# Patient Record
Sex: FEMALE | Race: WHITE | Employment: UNEMPLOYED | URBAN - NONMETROPOLITAN AREA
[De-identification: names, ages, dates, MRNs, and addresses within clinical notes are randomized per-mention and may not be internally consistent; named-entity substitution may affect disease eponyms.]

---
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
## 2023-01-05 ENCOUNTER — OFFICE VISIT (OUTPATIENT)
Dept: FAMILY MEDICINE CLINIC | Age: 18
End: 2023-01-05

## 2023-01-05 VITALS
HEIGHT: 68 IN | TEMPERATURE: 97.9 F | BODY MASS INDEX: 18.52 KG/M2 | WEIGHT: 122.2 LBS | HEART RATE: 81 BPM | OXYGEN SATURATION: 99 % | DIASTOLIC BLOOD PRESSURE: 72 MMHG | RESPIRATION RATE: 16 BRPM | SYSTOLIC BLOOD PRESSURE: 118 MMHG

## 2023-01-05 DIAGNOSIS — F32.5 MAJOR DEPRESSIVE DISORDER WITH SINGLE EPISODE, IN FULL REMISSION (HCC): Primary | ICD-10-CM

## 2023-01-05 DIAGNOSIS — F41.1 GAD (GENERALIZED ANXIETY DISORDER): ICD-10-CM

## 2023-01-05 PROCEDURE — 99213 OFFICE O/P EST LOW 20 MIN: CPT | Performed by: NURSE PRACTITIONER

## 2023-01-05 ASSESSMENT — PATIENT HEALTH QUESTIONNAIRE - PHQ9
SUM OF ALL RESPONSES TO PHQ QUESTIONS 1-9: 0
SUM OF ALL RESPONSES TO PHQ QUESTIONS 1-9: 0
DEPRESSION UNABLE TO ASSESS: FUNCTIONAL CAPACITY MOTIVATION LIMITS ACCURACY
SUM OF ALL RESPONSES TO PHQ QUESTIONS 1-9: 0
2. FEELING DOWN, DEPRESSED OR HOPELESS: 0
SUM OF ALL RESPONSES TO PHQ QUESTIONS 1-9: 0
SUM OF ALL RESPONSES TO PHQ9 QUESTIONS 1 & 2: 0
1. LITTLE INTEREST OR PLEASURE IN DOING THINGS: 0
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
## 2023-03-01 ENCOUNTER — HOSPITAL ENCOUNTER (EMERGENCY)
Age: 18
Discharge: LEFT BEFORE DISCHARGE | End: 2023-03-01
Attending: EMERGENCY MEDICINE
Payer: COMMERCIAL

## 2023-03-01 VITALS
OXYGEN SATURATION: 100 % | SYSTOLIC BLOOD PRESSURE: 130 MMHG | HEIGHT: 68 IN | RESPIRATION RATE: 17 BRPM | TEMPERATURE: 98.6 F | WEIGHT: 120 LBS | BODY MASS INDEX: 18.19 KG/M2 | DIASTOLIC BLOOD PRESSURE: 69 MMHG | HEART RATE: 84 BPM

## 2023-03-01 DIAGNOSIS — Z53.21 ELOPED FROM EMERGENCY DEPARTMENT: Primary | ICD-10-CM

## 2023-03-01 DIAGNOSIS — R51.9 NONINTRACTABLE HEADACHE, UNSPECIFIED CHRONICITY PATTERN, UNSPECIFIED HEADACHE TYPE: ICD-10-CM

## 2023-03-01 PROCEDURE — 6370000000 HC RX 637 (ALT 250 FOR IP): Performed by: EMERGENCY MEDICINE

## 2023-03-01 PROCEDURE — 99283 EMERGENCY DEPT VISIT LOW MDM: CPT

## 2023-03-01 RX ORDER — DIPHENHYDRAMINE HCL 25 MG
25 TABLET ORAL ONCE
Status: COMPLETED | OUTPATIENT
Start: 2023-03-01 | End: 2023-03-01

## 2023-03-01 RX ORDER — METOCLOPRAMIDE 10 MG/1
10 TABLET ORAL ONCE
Status: COMPLETED | OUTPATIENT
Start: 2023-03-01 | End: 2023-03-01

## 2023-03-01 RX ORDER — ACETAMINOPHEN 325 MG/1
650 TABLET ORAL ONCE
Status: COMPLETED | OUTPATIENT
Start: 2023-03-01 | End: 2023-03-01

## 2023-03-01 RX ADMIN — ACETAMINOPHEN 650 MG: 325 TABLET ORAL at 21:39

## 2023-03-01 RX ADMIN — METOCLOPRAMIDE 10 MG: 10 TABLET ORAL at 21:39

## 2023-03-01 RX ADMIN — DIPHENHYDRAMINE HYDROCHLORIDE 25 MG: 25 TABLET ORAL at 21:39

## 2023-03-01 ASSESSMENT — ENCOUNTER SYMPTOMS
BACK PAIN: 0
VOMITING: 0
SINUS PRESSURE: 0
EYE ITCHING: 0
EYE REDNESS: 0
SORE THROAT: 0
SHORTNESS OF BREATH: 0
RHINORRHEA: 0
EYE DISCHARGE: 0
NAUSEA: 0
CONSTIPATION: 0
BLOOD IN STOOL: 0
ABDOMINAL DISTENTION: 0
DIARRHEA: 0
VOICE CHANGE: 0
WHEEZING: 0
CHOKING: 0
COUGH: 0
EYE PAIN: 0
TROUBLE SWALLOWING: 0
ABDOMINAL PAIN: 0
CHEST TIGHTNESS: 0
PHOTOPHOBIA: 0

## 2023-03-01 ASSESSMENT — PAIN SCALES - GENERAL
PAINLEVEL_OUTOF10: 7
PAINLEVEL_OUTOF10: 7

## 2023-03-01 ASSESSMENT — PAIN DESCRIPTION - PAIN TYPE: TYPE: ACUTE PAIN

## 2023-03-01 ASSESSMENT — PAIN DESCRIPTION - DESCRIPTORS: DESCRIPTORS: ACHING

## 2023-03-01 ASSESSMENT — PAIN - FUNCTIONAL ASSESSMENT: PAIN_FUNCTIONAL_ASSESSMENT: 0-10

## 2023-03-01 ASSESSMENT — PAIN DESCRIPTION - LOCATION: LOCATION: HEAD

## 2023-03-01 ASSESSMENT — PAIN DESCRIPTION - FREQUENCY: FREQUENCY: CONTINUOUS

## 2023-03-01 ASSESSMENT — PAIN DESCRIPTION - ONSET: ONSET: ON-GOING

## 2023-03-02 NOTE — ED TRIAGE NOTES
Pt presents to the ED with c/o a headache that has been going on everyday for the past moth. Pt states there is nothing that triggers her headaches and that they just happen irregularly throughout the day. VSS.

## 2023-03-02 NOTE — ED PROVIDER NOTES
[facility]  eMERGENCY dEPARTMENT eNCOUnter          CHIEF COMPLAINT       Chief Complaint   Patient presents with    Headache       Nurses Notes reviewed and I agree except as noted in the HPI. HISTORY OF PRESENT ILLNESS    Yfn Raymundo is a 21 y.o. female who presents presents today for headache. Patient states she has had a headache for approximately 2 to 3 months. Patient states she may have some mild sinus problems. Patient states she has not had any change in taste or smell. No change in her hearing. She has had no syncopal or presyncopal episodes. She has had no loss of control of either side of her body. She has no speech impediment. No difficulty moving. She states this is not the worst headache of her life. It was not thunderclap and was not sudden in onset. Patient states she does have a history of headaches but they are infrequent in nature. Patient states that over the last month she has been using Motrin for this but it does not seem to help. Patient is otherwise resting comfortably on cot no apparent distress or other physical complaints at this time. REVIEW OF SYSTEMS     Review of Systems   Constitutional:  Negative for activity change, appetite change, diaphoresis, fatigue and unexpected weight change. HENT:  Negative for congestion, ear discharge, ear pain, hearing loss, rhinorrhea, sinus pressure, sore throat, trouble swallowing and voice change. Eyes:  Negative for photophobia, pain, discharge, redness and itching. Respiratory:  Negative for cough, choking, chest tightness, shortness of breath and wheezing. Cardiovascular:  Negative for chest pain, palpitations and leg swelling. Gastrointestinal:  Negative for abdominal distention, abdominal pain, blood in stool, constipation, diarrhea, nausea and vomiting. Endocrine: Negative for polydipsia, polyphagia and polyuria.    Genitourinary:  Negative for decreased urine volume, difficulty urinating, dysuria, enuresis, frequency, hematuria and urgency.   Musculoskeletal:  Negative for arthralgias, back pain, gait problem, myalgias, neck pain and neck stiffness.   Skin:  Negative for pallor and rash.   Allergic/Immunologic: Negative for immunocompromised state.   Neurological:  Positive for headaches. Negative for dizziness, tremors, seizures, syncope, facial asymmetry, weakness, light-headedness and numbness.   Hematological:  Negative for adenopathy. Does not bruise/bleed easily.   Psychiatric/Behavioral:  Negative for agitation, hallucinations and suicidal ideas. The patient is not nervous/anxious.         PAST MEDICAL HISTORY    has a past medical history of Patient denies medical problems.    SURGICAL HISTORY      has a past surgical history that includes Tympanostomy tube placement (Bilateral).    CURRENT MEDICATIONS       Previous Medications    ACETAMINOPHEN (TYLENOL) 325 MG TABLET    Take 650 mg by mouth every 6 hours as needed for Pain    BUPROPION (WELLBUTRIN XL) 150 MG EXTENDED RELEASE TABLET    Take 1 tablet by mouth every morning    IBUPROFEN (ADVIL;MOTRIN) 600 MG TABLET    Take 1 tablet by mouth 3 times daily as needed for Pain    LIDOCAINE VISCOUS HCL (XYLOCAINE) 2 % SOLN SOLUTION    Place 15 mLs vaginally every 3 hours as needed for Irritation       ALLERGIES     has No Known Allergies.    FAMILY HISTORY     She indicated that her mother is alive. She indicated that her father is alive.   family history includes Anxiety Disorder in her father; High Blood Pressure in her father; No Known Problems in her mother.    SOCIAL HISTORY      reports that she has quit smoking. Her smoking use included e-cigarettes. She has never used smokeless tobacco. She reports current drug use. Drug: Marijuana (Weed). She reports that she does not drink alcohol.    PHYSICAL EXAM     INITIAL VITALS:  height is 5' 8\" (1.727 m) and weight is 120 lb (54.4 kg). Her oral temperature is 98.6 °F (37 °C). Her blood pressure is  130/69 and her pulse is 84. Her respiration is 17 and oxygen saturation is 100%. Physical Exam  Vitals and nursing note reviewed. Constitutional:       General: She is not in acute distress. Appearance: She is well-developed. She is not diaphoretic. HENT:      Head: Normocephalic and atraumatic. Right Ear: Hearing, tympanic membrane, ear canal and external ear normal.      Left Ear: Hearing, tympanic membrane, ear canal and external ear normal.      Nose: Congestion present. No rhinorrhea. Right Sinus: No maxillary sinus tenderness or frontal sinus tenderness. Left Sinus: No maxillary sinus tenderness or frontal sinus tenderness. Mouth/Throat:      Pharynx: No oropharyngeal exudate. Eyes:      General: No scleral icterus. Right eye: No discharge. Left eye: No discharge. Conjunctiva/sclera: Conjunctivae normal.      Pupils: Pupils are equal, round, and reactive to light. Neck:      Thyroid: No thyromegaly. Vascular: No JVD. Trachea: No tracheal deviation. Cardiovascular:      Rate and Rhythm: Normal rate and regular rhythm. Pulses:           Carotid pulses are 2+ on the right side and 2+ on the left side. Radial pulses are 2+ on the right side and 2+ on the left side. Femoral pulses are 2+ on the right side and 2+ on the left side. Popliteal pulses are 2+ on the right side and 2+ on the left side. Dorsalis pedis pulses are 2+ on the right side and 2+ on the left side. Posterior tibial pulses are 2+ on the right side and 2+ on the left side. Heart sounds: Normal heart sounds, S1 normal and S2 normal. No murmur heard. No friction rub. No gallop. Pulmonary:      Effort: Pulmonary effort is normal.      Breath sounds: Normal breath sounds. No stridor. No decreased breath sounds, wheezing, rhonchi or rales. Chest:      Chest wall: No mass, lacerations, deformity, swelling, tenderness, crepitus or edema. There is no dullness to percussion. Abdominal:      General: Bowel sounds are normal. There is no distension. Palpations: Abdomen is soft. There is no mass. Tenderness: There is no abdominal tenderness. There is no guarding or rebound. Hernia: No hernia is present. Musculoskeletal:         General: No tenderness. Normal range of motion. Cervical back: Normal range of motion and neck supple. Right lower leg: No edema. Left lower leg: No edema. Lymphadenopathy:      Cervical: No cervical adenopathy. Skin:     General: Skin is warm and dry. Findings: No bruising, ecchymosis, lesion or rash. Neurological:      Mental Status: She is alert and oriented to person, place, and time. GCS: GCS eye subscore is 4. GCS verbal subscore is 5. GCS motor subscore is 6. Cranial Nerves: Cranial nerves 2-12 are intact. No cranial nerve deficit. Sensory: Sensation is intact. Motor: Motor function is intact. Coordination: Coordination is intact. Coordination normal.      Gait: Gait is intact. Deep Tendon Reflexes: Reflexes are normal and symmetric. Reflex Scores:       Tricep reflexes are 2+ on the right side and 2+ on the left side. Bicep reflexes are 2+ on the right side and 2+ on the left side. Brachioradialis reflexes are 2+ on the right side and 2+ on the left side. Patellar reflexes are 2+ on the right side and 2+ on the left side. Achilles reflexes are 2+ on the right side and 2+ on the left side. Comments: Gait and station are normal.   Psychiatric:         Speech: Speech normal.         Behavior: Behavior normal.         Thought Content:  Thought content normal.         Judgment: Judgment normal.         DIFFERENTIAL DIAGNOSIS:   Tension headache migraine headache cluster headache sinus headache    DIAGNOSTIC RESULTS     EKG: All EKG's are interpreted by the Emergency Department Physician who either signs or Co-signs this chart in the absence of a cardiologist.  None    RADIOLOGY: non-plain film images(s) such as CT, Ultrasound and MRI are read by the radiologist.  No orders to display         LABS:   Labs Reviewed - No data to display    EMERGENCY DEPARTMENT COURSE:   Vitals:    Vitals:    03/01/23 2039   BP: 130/69   Pulse: 84   Resp: 17   Temp: 98.6 °F (37 °C)   TempSrc: Oral   SpO2: 100%   Weight: 120 lb (54.4 kg)   Height: 5' 8\" (1.727 m)     Patient was assessed at bedside labs and imaging were ordered. Patient was given Reglan Benadryl and Tylenol. I waited approximately 45 minutes and went back to check on the patient. Patient was not in the room. Nurses were unaware if the patient had left or not. They checked the waiting room and they checked all the restaurants. Patient is not on the premises. Patient has eloped from the department. CRITICAL CARE:   None    CONSULTS:  None    PROCEDURES:  None    FINAL IMPRESSION      1. Eloped from emergency department    2.  Nonintractable headache, unspecified chronicity pattern, unspecified headache type          DISPOSITION/PLAN   Eloped    PATIENT REFERRED TO:  [address] [place] [address]  [phone]  Call in 1 day      DISCHARGE MEDICATIONS:  New Prescriptions    No medications on file       (Please note that portions of this note were completed with a voice recognition program.  Efforts were made to edit the dictations but occasionally words are mis-transcribed.)    DO Stacey Aragon DO  03/01/23 2457

## 2023-03-02 NOTE — ED NOTES
This nurse entered room to reassess pt and pt was not in room and could not be found by staff in ED. Provider notified pt eloped.      Quentin Fish RN  03/01/23 9524

## 2023-05-01 RX ORDER — BUPROPION HYDROCHLORIDE 150 MG/1
TABLET ORAL
Qty: 90 TABLET | Refills: 1 | Status: SHIPPED | OUTPATIENT
Start: 2023-05-01

## 2023-05-02 ENCOUNTER — HOSPITAL ENCOUNTER (EMERGENCY)
Age: 18
Discharge: HOME | End: 2023-05-02
Payer: COMMERCIAL

## 2023-05-02 VITALS
HEART RATE: 75 BPM | RESPIRATION RATE: 18 BRPM | SYSTOLIC BLOOD PRESSURE: 119 MMHG | WEIGHT: 120 LBS | OXYGEN SATURATION: 100 % | BODY MASS INDEX: 18.19 KG/M2 | DIASTOLIC BLOOD PRESSURE: 76 MMHG | HEIGHT: 68 IN | TEMPERATURE: 97 F

## 2023-05-02 DIAGNOSIS — K52.9 GASTROENTERITIS: Primary | ICD-10-CM

## 2023-05-02 PROCEDURE — 6370000000 HC RX 637 (ALT 250 FOR IP): Performed by: NURSE PRACTITIONER

## 2023-05-02 PROCEDURE — 99214 OFFICE O/P EST MOD 30 MIN: CPT | Performed by: NURSE PRACTITIONER

## 2023-05-02 PROCEDURE — 99213 OFFICE O/P EST LOW 20 MIN: CPT

## 2023-05-02 RX ORDER — ONDANSETRON 4 MG/1
4 TABLET, ORALLY DISINTEGRATING ORAL ONCE
Status: COMPLETED | OUTPATIENT
Start: 2023-05-02 | End: 2023-05-02

## 2023-05-02 RX ORDER — ONDANSETRON 4 MG/1
4 TABLET, ORALLY DISINTEGRATING ORAL 3 TIMES DAILY PRN
Qty: 21 TABLET | Refills: 0 | Status: SHIPPED | OUTPATIENT
Start: 2023-05-02

## 2023-05-02 RX ADMIN — ONDANSETRON 4 MG: 4 TABLET, ORALLY DISINTEGRATING ORAL at 19:53

## 2023-05-02 ASSESSMENT — ENCOUNTER SYMPTOMS
COUGH: 0
NAUSEA: 1
DIARRHEA: 0
EYE REDNESS: 0
TROUBLE SWALLOWING: 0
SHORTNESS OF BREATH: 0
SORE THROAT: 0
VOMITING: 1
EYE DISCHARGE: 0
RHINORRHEA: 0

## 2023-05-02 ASSESSMENT — PAIN - FUNCTIONAL ASSESSMENT: PAIN_FUNCTIONAL_ASSESSMENT: NONE - DENIES PAIN

## 2023-05-02 NOTE — ED NOTES
Pt with complaints of vomiting that started today and ended at 1600. Denies having any pain. States she is just here for a work note.      Waleska Reid, NATHEN  53/06/25 1460

## 2023-05-02 NOTE — ED PROVIDER NOTES
time.   Psychiatric:         Mood and Affect: Mood normal.         Behavior: Behavior normal.         Thought Content: Thought content normal.         Judgment: Judgment normal.       DIAGNOSTIC RESULTS   Labs:No results found for this visit on 05/02/23. IMAGING:  No orders to display      URGENT CARE COURSE:     Vitals:    05/02/23 1929   BP: 119/76   Pulse: 75   Resp: 18   Temp: 97 °F (36.1 °C)   TempSrc: Temporal   SpO2: 100%   Weight: 120 lb (54.4 kg)   Height: 5' 8\" (1.727 m)       Medications   ondansetron (ZOFRAN-ODT) disintegrating tablet 4 mg (has no administration in time range)     PROCEDURES:  None  FINAL IMPRESSION      1. Gastroenteritis        DISPOSITION/PLAN   DISPOSITION Decision To Discharge 05/02/2023 07:50:48 PM    Patient will be given prescription for Zofran and advised to follow-up with PCP in 1 week if no improvement in symptoms. Work slip issued for today and to return tomorrow. Discharged home in good condition.   PATIENT REFERRED TO:  [address]  [address] [facility]  In 1 week  If symptoms worsen, [phone]    DISCHARGE MEDICATIONS:  New Prescriptions    ONDANSETRON (ZOFRAN-ODT) 4 MG DISINTEGRATING TABLET    Take 1 tablet by mouth 3 times daily as needed for Nausea or Vomiting     Current Discharge Medication List          Sukhdeep Ann, ROBIN - CNP  05/02/23 1953

## 2023-05-23 ENCOUNTER — APPOINTMENT (OUTPATIENT)
Dept: GENERAL RADIOLOGY | Age: 18
End: 2023-05-23
Payer: COMMERCIAL

## 2023-05-23 ENCOUNTER — HOSPITAL ENCOUNTER (EMERGENCY)
Age: 18
Discharge: HOME | End: 2023-05-23
Payer: COMMERCIAL

## 2023-05-23 VITALS
SYSTOLIC BLOOD PRESSURE: 122 MMHG | WEIGHT: 120 LBS | DIASTOLIC BLOOD PRESSURE: 63 MMHG | HEART RATE: 76 BPM | RESPIRATION RATE: 18 BRPM | TEMPERATURE: 97.9 F | HEIGHT: 68 IN | OXYGEN SATURATION: 99 % | BODY MASS INDEX: 18.19 KG/M2

## 2023-05-23 DIAGNOSIS — S46.911A STRAIN OF RIGHT SHOULDER, INITIAL ENCOUNTER: Primary | ICD-10-CM

## 2023-05-23 DIAGNOSIS — S63.501A SPRAIN OF RIGHT WRIST, INITIAL ENCOUNTER: ICD-10-CM

## 2023-05-23 PROCEDURE — 73110 X-RAY EXAM OF WRIST: CPT

## 2023-05-23 PROCEDURE — 99283 EMERGENCY DEPT VISIT LOW MDM: CPT

## 2023-05-23 PROCEDURE — 73030 X-RAY EXAM OF SHOULDER: CPT

## 2023-05-23 ASSESSMENT — PAIN DESCRIPTION - LOCATION: LOCATION: ARM

## 2023-05-23 ASSESSMENT — PAIN SCALES - GENERAL: PAINLEVEL_OUTOF10: 7

## 2023-05-23 ASSESSMENT — PAIN DESCRIPTION - ORIENTATION: ORIENTATION: RIGHT

## 2023-05-23 ASSESSMENT — PAIN - FUNCTIONAL ASSESSMENT: PAIN_FUNCTIONAL_ASSESSMENT: 0-10

## 2023-05-24 NOTE — ED TRIAGE NOTES
Pt presents to the ED from home with complaints of right shoulder pain, arm pain and wrist pain. Pt states she fell on her wrist a week ago roller skating and recently rode a roller coaster, while she was riding her arm flew back and \"something pulled. \" Pt rates pain a 7/10.
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
## 2023-09-17 ENCOUNTER — HOSPITAL ENCOUNTER (EMERGENCY)
Age: 18
Discharge: HOME | End: 2023-09-17
Payer: COMMERCIAL

## 2023-09-17 VITALS
RESPIRATION RATE: 18 BRPM | BODY MASS INDEX: 17.94 KG/M2 | DIASTOLIC BLOOD PRESSURE: 74 MMHG | OXYGEN SATURATION: 99 % | HEART RATE: 63 BPM | TEMPERATURE: 98.7 F | WEIGHT: 118 LBS | SYSTOLIC BLOOD PRESSURE: 144 MMHG

## 2023-09-17 DIAGNOSIS — R10.9 ABDOMINAL CRAMPS: ICD-10-CM

## 2023-09-17 DIAGNOSIS — Z3A.08 PREGNANCY WITH 8 COMPLETED WEEKS GESTATION: Primary | ICD-10-CM

## 2023-09-17 LAB
BILIRUB UR STRIP.AUTO-MCNC: NEGATIVE MG/DL
CHARACTER UR: CLEAR
COLOR: YELLOW
GLUCOSE UR QL STRIP.AUTO: NEGATIVE MG/DL
HCG UR QL: POSITIVE
KETONES UR QL STRIP.AUTO: NEGATIVE
NITRITE UR QL STRIP.AUTO: NEGATIVE
PH UR STRIP.AUTO: 6 [PH] (ref 5–9)
PROT UR STRIP.AUTO-MCNC: NEGATIVE MG/DL
RBC #/AREA URNS HPF: NEGATIVE /[HPF]
SP GR UR STRIP.AUTO: 1.02 (ref 1–1.03)
UROBILINOGEN, URINE: 0.2 EU/DL (ref 0.2–1)
WBC #/AREA URNS HPF: NEGATIVE /[HPF]

## 2023-09-17 PROCEDURE — 81003 URINALYSIS AUTO W/O SCOPE: CPT

## 2023-09-17 PROCEDURE — 99213 OFFICE O/P EST LOW 20 MIN: CPT

## 2023-09-17 PROCEDURE — 84703 CHORIONIC GONADOTROPIN ASSAY: CPT

## 2023-09-17 ASSESSMENT — ENCOUNTER SYMPTOMS
WHEEZING: 0
EYE REDNESS: 0
VOMITING: 0
DIARRHEA: 0
ALLERGIC/IMMUNOLOGIC NEGATIVE: 1
EYE PAIN: 0
RHINORRHEA: 0
BACK PAIN: 1
SORE THROAT: 0
NAUSEA: 0
EYE DISCHARGE: 0
SHORTNESS OF BREATH: 0
COUGH: 0
ABDOMINAL PAIN: 1
CONSTIPATION: 0
TROUBLE SWALLOWING: 0

## 2023-09-17 ASSESSMENT — PAIN DESCRIPTION - DESCRIPTORS: DESCRIPTORS: SHOOTING

## 2023-09-17 ASSESSMENT — PAIN DESCRIPTION - FREQUENCY: FREQUENCY: CONTINUOUS

## 2023-09-17 ASSESSMENT — PAIN SCALES - GENERAL: PAINLEVEL_OUTOF10: 5

## 2023-09-17 ASSESSMENT — PAIN DESCRIPTION - ORIENTATION: ORIENTATION: RIGHT;LEFT;LOWER

## 2023-09-17 ASSESSMENT — PAIN - FUNCTIONAL ASSESSMENT: PAIN_FUNCTIONAL_ASSESSMENT: 0-10

## 2023-09-17 ASSESSMENT — PAIN DESCRIPTION - PAIN TYPE: TYPE: ACUTE PAIN

## 2023-09-17 ASSESSMENT — PAIN DESCRIPTION - LOCATION: LOCATION: BACK

## 2023-09-17 NOTE — ED TRIAGE NOTES
Patient to room with c/o abdominal cramping and lower back pain beginning yesterday evening. 6 weeks pregnant. Denies vaginal bleeding. Urine specimen obtained.

## 2023-11-25 ENCOUNTER — HOSPITAL ENCOUNTER (EMERGENCY)
Age: 18
Discharge: HOME | End: 2023-11-25
Payer: COMMERCIAL

## 2023-11-25 VITALS
WEIGHT: 127 LBS | SYSTOLIC BLOOD PRESSURE: 114 MMHG | OXYGEN SATURATION: 98 % | HEART RATE: 66 BPM | BODY MASS INDEX: 19.31 KG/M2 | TEMPERATURE: 97.3 F | RESPIRATION RATE: 15 BRPM | DIASTOLIC BLOOD PRESSURE: 77 MMHG

## 2023-11-25 DIAGNOSIS — O21.9 VOMITING DURING PREGNANCY: Primary | ICD-10-CM

## 2023-11-25 PROCEDURE — 99213 OFFICE O/P EST LOW 20 MIN: CPT | Performed by: EMERGENCY MEDICINE

## 2023-11-25 PROCEDURE — 99213 OFFICE O/P EST LOW 20 MIN: CPT

## 2023-11-25 RX ORDER — METOCLOPRAMIDE 10 MG/1
10 TABLET ORAL 3 TIMES DAILY PRN
Qty: 21 TABLET | Refills: 0 | Status: SHIPPED | OUTPATIENT
Start: 2023-11-25

## 2023-11-25 ASSESSMENT — ENCOUNTER SYMPTOMS
DIARRHEA: 0
ABDOMINAL PAIN: 0
ABDOMINAL DISTENTION: 0
COUGH: 0
CONSTIPATION: 0
VOMITING: 1
NAUSEA: 1
SHORTNESS OF BREATH: 0

## 2023-11-25 NOTE — ED PROVIDER NOTES
[facility]  Urgent Care Encounter       CHIEF COMPLAINT       Chief Complaint   Patient presents with    Morning Sickness       Nurses Notes reviewed and I agree except as noted in the HPI. HISTORY OF PRESENT ILLNESS   J Luis Blanoc is a 19 y.o. female who presents for episodes of vomiting that began this morning. Patient states that she is currently 16 weeks pregnant. She had morning sickness early in her pregnancy. She has not had any nausea or vomiting for the past several weeks. Patient denies abdominal pain. No fever. No diarrhea. Denies dysuria, frequency or urgency. Patient denies any vaginal discharge or bleeding. The patient took a Zofran tablet at home this morning and states threw up 30 minutes later. She has had a total of 4 episodes of emesis today. HPI    REVIEW OF SYSTEMS     Review of Systems   Constitutional:  Negative for activity change, fatigue and fever. Respiratory:  Negative for cough and shortness of breath. Gastrointestinal:  Positive for nausea and vomiting. Negative for abdominal distention, abdominal pain, constipation and diarrhea. Genitourinary:  Negative for difficulty urinating, dysuria, frequency and urgency. PAST MEDICAL HISTORY         Diagnosis Date    Patient denies medical problems        SURGICALHISTORY     Patient  has a past surgical history that includes Tympanostomy tube placement (Bilateral). CURRENT MEDICATIONS       Discharge Medication List as of 11/25/2023 11:38 AM        CONTINUE these medications which have NOT CHANGED    Details   ondansetron (ZOFRAN-ODT) 4 MG disintegrating tablet Take 1 tablet by mouth 3 times daily as needed for Nausea or Vomiting, Disp-21 tablet, R-0Normal      buPROPion (WELLBUTRIN XL) 150 MG extended release tablet TAKE 1 TABLET BY MOUTH ONCE DAILY IN THE MORNING, Disp-90 tablet, R-1Normal             ALLERGIES     Patient is has No Known Allergies.     Patients   There is no

## 2023-11-25 NOTE — DISCHARGE INSTRUCTIONS
Discontinue Zofran and begin taking Reglan 3 times daily as needed for nausea/vomiting    Drink small amounts of fluids frequently    Return for uncontrolled vomiting or any new concerns    Go to the emergency department for abdominal pain, fever or any new concerns    Follow-up with your OB/GYN next week

## 2023-12-07 ENCOUNTER — HOSPITAL ENCOUNTER (EMERGENCY)
Age: 18
Discharge: HOME | End: 2023-12-07

## 2023-12-07 VITALS
BODY MASS INDEX: 19.31 KG/M2 | RESPIRATION RATE: 16 BRPM | OXYGEN SATURATION: 99 % | TEMPERATURE: 98.2 F | DIASTOLIC BLOOD PRESSURE: 66 MMHG | SYSTOLIC BLOOD PRESSURE: 132 MMHG | HEART RATE: 86 BPM | WEIGHT: 127 LBS

## 2023-12-07 DIAGNOSIS — J06.9 VIRAL URI: Primary | ICD-10-CM

## 2023-12-07 DIAGNOSIS — Z3A.17 17 WEEKS GESTATION OF PREGNANCY: ICD-10-CM

## 2023-12-07 RX ORDER — ACETAMINOPHEN 325 MG/1
650 TABLET ORAL EVERY 6 HOURS PRN
COMMUNITY

## 2023-12-07 ASSESSMENT — ENCOUNTER SYMPTOMS
ABDOMINAL PAIN: 0
SHORTNESS OF BREATH: 0
DIARRHEA: 0
NAUSEA: 1
VOMITING: 0
SORE THROAT: 0
SINUS PRESSURE: 1
COUGH: 0

## 2023-12-07 ASSESSMENT — PAIN DESCRIPTION - DESCRIPTORS: DESCRIPTORS: SORE

## 2023-12-07 ASSESSMENT — PAIN DESCRIPTION - PAIN TYPE: TYPE: ACUTE PAIN

## 2023-12-07 ASSESSMENT — PAIN - FUNCTIONAL ASSESSMENT
PAIN_FUNCTIONAL_ASSESSMENT: 0-10
PAIN_FUNCTIONAL_ASSESSMENT: ACTIVITIES ARE NOT PREVENTED

## 2023-12-07 ASSESSMENT — PAIN DESCRIPTION - LOCATION: LOCATION: EAR

## 2023-12-07 ASSESSMENT — PAIN DESCRIPTION - FREQUENCY: FREQUENCY: CONTINUOUS

## 2023-12-07 ASSESSMENT — PAIN SCALES - GENERAL: PAINLEVEL_OUTOF10: 4

## 2023-12-07 ASSESSMENT — PAIN DESCRIPTION - ORIENTATION: ORIENTATION: RIGHT;LEFT

## 2024-01-19 ENCOUNTER — APPOINTMENT (OUTPATIENT)
Dept: GENERAL RADIOLOGY | Age: 19
End: 2024-01-19
Payer: COMMERCIAL

## 2024-01-19 ENCOUNTER — HOSPITAL ENCOUNTER (EMERGENCY)
Age: 19
Discharge: HOME | End: 2024-01-19
Attending: STUDENT IN AN ORGANIZED HEALTH CARE EDUCATION/TRAINING PROGRAM
Payer: COMMERCIAL

## 2024-01-19 VITALS
DIASTOLIC BLOOD PRESSURE: 71 MMHG | WEIGHT: 140 LBS | RESPIRATION RATE: 18 BRPM | SYSTOLIC BLOOD PRESSURE: 132 MMHG | HEIGHT: 68 IN | BODY MASS INDEX: 21.22 KG/M2 | OXYGEN SATURATION: 99 % | TEMPERATURE: 98.3 F | HEART RATE: 68 BPM

## 2024-01-19 DIAGNOSIS — M25.561 ACUTE PAIN OF RIGHT KNEE: Primary | ICD-10-CM

## 2024-01-19 PROCEDURE — 99283 EMERGENCY DEPT VISIT LOW MDM: CPT

## 2024-01-19 PROCEDURE — 73564 X-RAY EXAM KNEE 4 OR MORE: CPT

## 2024-01-19 ASSESSMENT — PAIN DESCRIPTION - ORIENTATION: ORIENTATION: RIGHT

## 2024-01-19 ASSESSMENT — PAIN SCALES - GENERAL: PAINLEVEL_OUTOF10: 4

## 2024-01-19 ASSESSMENT — PAIN DESCRIPTION - LOCATION: LOCATION: KNEE

## 2024-01-19 ASSESSMENT — PAIN DESCRIPTION - PAIN TYPE: TYPE: ACUTE PAIN

## 2024-01-19 ASSESSMENT — PAIN - FUNCTIONAL ASSESSMENT: PAIN_FUNCTIONAL_ASSESSMENT: 0-10

## 2024-01-19 NOTE — ED PROVIDER NOTES
[facility]  EMERGENCY DEPARTMENT ENCOUNTER      Patient Name: Mily Suárez  MRN: [id number]  YOB: 2005  Date of Evaluation: 1/19/2024  Emergency Physician: Augusto Borja MD    CHIEF COMPLAINT       Chief Complaint   Patient presents with    Knee Pain     Right       HISTORY OF PRESENT ILLNESS    HPI    History obtained from chart review and the patient.    Mily Suárez is a 18 y.o. female who presents to the emergency department from home as a walk in to the ED lobby for evaluation of right knee pain.  Patient reports that about a month ago while on a trip in [place] pain started and subsided within a couple of days.  Pain had resolved until earlier this week when it came back.  Pain is bad enough she is unable to drive.  Pain is worse in the top of her knee.  She also is currently 20 weeks pregnant has not had any complications in this pregnancy she follows with an obstetrician at [facility].  She denies any nausea vomiting cough cold runny nose fever or other symptoms.  She not have any dysuria.  Patient reports no known trauma to this knee either a month ago or recent    Pertinent previous and/or external records reviewed: Non-contributory    REVIEW OF SYSTEMS   Review of Systems  Negative unless documented in HPI    PAST MEDICAL AND SURGICAL HISTORY     Past Medical History:   Diagnosis Date    Patient denies medical problems        Past Surgical History:   Procedure Laterality Date    TYMPANOSTOMY TUBE PLACEMENT Bilateral        CURRENT MEDICATIONS     Discharge Medication List as of 1/19/2024  7:00 PM        CONTINUE these medications which have NOT CHANGED    Details   acetaminophen (TYLENOL) 325 MG tablet Take 2 tablets by mouth every 6 hours as needed for PainHistorical Med      metoclopramide (REGLAN) 10 MG tablet Take 1 tablet by mouth 3 times daily as needed (nausea/vomiting), Disp-21 tablet, R-0Normal      ondansetron (ZOFRAN-ODT) 4 MG

## 2024-01-19 NOTE — ED TRIAGE NOTES
Pt to ED from home with c/o right knee pain. Pt states that a month ago they flew to [place] and the pain happened then and subsided. Pt states it came back this week. Pt states it hurts so bad she is unable to drive. Reports that the pain is \"at the top area of the knee but goes into the knee\". Pt reports she is currently about 21 weeks pregnant. Also reports her sister had a cyst in her knee and had similar symptoms. VSS

## 2024-01-19 NOTE — DISCHARGE INSTRUCTIONS
You are seen today for knee pain your x-ray did not have any findings.  Continue taking acetaminophen as needed, continue ice and elevation.    Follow-up with [facility] they have a walk-in clinic Monday through Friday from 730am to 3:30 PM.    If symptoms are worse or other new concerns please come back to the Emergency Department for re-evaluation.

## 2024-02-26 ENCOUNTER — OFFICE VISIT (OUTPATIENT)
Dept: FAMILY MEDICINE CLINIC | Age: 19
End: 2024-02-26
Payer: COMMERCIAL

## 2024-02-26 VITALS
BODY MASS INDEX: 21.58 KG/M2 | DIASTOLIC BLOOD PRESSURE: 74 MMHG | HEART RATE: 76 BPM | TEMPERATURE: 98.2 F | WEIGHT: 142.4 LBS | SYSTOLIC BLOOD PRESSURE: 128 MMHG | RESPIRATION RATE: 16 BRPM | HEIGHT: 68 IN | OXYGEN SATURATION: 98 %

## 2024-02-26 DIAGNOSIS — R05.9 COUGH, UNSPECIFIED TYPE: Primary | ICD-10-CM

## 2024-02-26 DIAGNOSIS — J30.89 NON-SEASONAL ALLERGIC RHINITIS DUE TO OTHER ALLERGIC TRIGGER: ICD-10-CM

## 2024-02-26 DIAGNOSIS — Z20.828 EXPOSURE TO INFLUENZA: ICD-10-CM

## 2024-02-26 DIAGNOSIS — Z3A.29 29 WEEKS GESTATION OF PREGNANCY: ICD-10-CM

## 2024-02-26 LAB
INFLUENZA A ANTIBODY: NEGATIVE
INFLUENZA B ANTIBODY: NEGATIVE
Lab: 1234
QC PASS/FAIL: NORMAL
SARS-COV-2 RDRP RESP QL NAA+PROBE: NEGATIVE

## 2024-02-26 PROCEDURE — 87804 INFLUENZA ASSAY W/OPTIC: CPT | Performed by: NURSE PRACTITIONER

## 2024-02-26 PROCEDURE — 99213 OFFICE O/P EST LOW 20 MIN: CPT | Performed by: NURSE PRACTITIONER

## 2024-02-26 PROCEDURE — 87635 SARS-COV-2 COVID-19 AMP PRB: CPT | Performed by: NURSE PRACTITIONER

## 2024-02-26 RX ORDER — OSELTAMIVIR PHOSPHATE 75 MG/1
75 CAPSULE ORAL 2 TIMES DAILY
Qty: 10 CAPSULE | Refills: 0 | Status: SHIPPED | OUTPATIENT
Start: 2024-02-26 | End: 2024-03-02

## 2024-02-26 SDOH — ECONOMIC STABILITY: HOUSING INSECURITY
IN THE LAST 12 MONTHS, WAS THERE A TIME WHEN YOU DID NOT HAVE A STEADY PLACE TO SLEEP OR SLEPT IN A SHELTER (INCLUDING NOW)?: NO

## 2024-02-26 SDOH — ECONOMIC STABILITY: FOOD INSECURITY: WITHIN THE PAST 12 MONTHS, THE FOOD YOU BOUGHT JUST DIDN'T LAST AND YOU DIDN'T HAVE MONEY TO GET MORE.: NEVER TRUE

## 2024-02-26 SDOH — ECONOMIC STABILITY: INCOME INSECURITY: HOW HARD IS IT FOR YOU TO PAY FOR THE VERY BASICS LIKE FOOD, HOUSING, MEDICAL CARE, AND HEATING?: NOT VERY HARD

## 2024-02-26 SDOH — ECONOMIC STABILITY: FOOD INSECURITY: WITHIN THE PAST 12 MONTHS, YOU WORRIED THAT YOUR FOOD WOULD RUN OUT BEFORE YOU GOT MONEY TO BUY MORE.: NEVER TRUE

## 2024-02-26 ASSESSMENT — PATIENT HEALTH QUESTIONNAIRE - PHQ9
SUM OF ALL RESPONSES TO PHQ QUESTIONS 1-9: 1
8. MOVING OR SPEAKING SO SLOWLY THAT OTHER PEOPLE COULD HAVE NOTICED. OR THE OPPOSITE, BEING SO FIGETY OR RESTLESS THAT YOU HAVE BEEN MOVING AROUND A LOT MORE THAN USUAL: 0
9. THOUGHTS THAT YOU WOULD BE BETTER OFF DEAD, OR OF HURTING YOURSELF: 0
SUM OF ALL RESPONSES TO PHQ QUESTIONS 1-9: 1
SUM OF ALL RESPONSES TO PHQ QUESTIONS 1-9: 1
7. TROUBLE CONCENTRATING ON THINGS, SUCH AS READING THE NEWSPAPER OR WATCHING TELEVISION: 0
SUM OF ALL RESPONSES TO PHQ QUESTIONS 1-9: 1
SUM OF ALL RESPONSES TO PHQ9 QUESTIONS 1 & 2: 0
2. FEELING DOWN, DEPRESSED OR HOPELESS: 0
6. FEELING BAD ABOUT YOURSELF - OR THAT YOU ARE A FAILURE OR HAVE LET YOURSELF OR YOUR FAMILY DOWN: 0
10. IF YOU CHECKED OFF ANY PROBLEMS, HOW DIFFICULT HAVE THESE PROBLEMS MADE IT FOR YOU TO DO YOUR WORK, TAKE CARE OF THINGS AT HOME, OR GET ALONG WITH OTHER PEOPLE: 0
1. LITTLE INTEREST OR PLEASURE IN DOING THINGS: 0
5. POOR APPETITE OR OVEREATING: 0
4. FEELING TIRED OR HAVING LITTLE ENERGY: 0
3. TROUBLE FALLING OR STAYING ASLEEP: 1

## 2024-02-26 NOTE — PROGRESS NOTES
SUBJECTIVE:  Mily Suárez is a 18 y.o. y/o female that presents with Cough (Head ache runny nose started today )  .    HPI:      Symptoms have been present for 1 day(s).  Symptoms are unchanged since they initially started.    Fever? No  Runny nose or congestion?  Yes   Cough?  Yes  Sore throat?  No  Headache, fatigue, joint pains, muscle aches?  No  Shortness of breath/Wheezing?  No  Nausea/Vomiting/Diarrhea?  No  Double Sickening?  No  Sick contacts? Yes - SO with influenza A  Known COVID exposures of RFs?  No  Smoker?  No  Preexisting Respiratory conditions?  No    Patient has tried nothing with no improvement.    Pt is 29 weeks pregnancy  Pregnancy going well no concerns.    Able to eat and drink keeping hydrated  No sore throat or ear pain  Feeling fatigued.  Denies SOB or wheezing     Past Medical History:   Diagnosis Date    Patient denies medical problems        Social History     Socioeconomic History    Marital status: Single     Spouse name: Not on file    Number of children: Not on file    Years of education: Not on file    Highest education level: Not on file   Occupational History    Not on file   Tobacco Use    Smoking status: Former     Types: E-Cigarettes    Smokeless tobacco: Never   Vaping Use    Vaping Use: Never used   Substance and Sexual Activity    Alcohol use: Never    Drug use: Not Currently     Types: Marijuana (Weed)    Sexual activity: Not on file   Other Topics Concern    Not on file   Social History Narrative    Not on file     Social Determinants of Health     Financial Resource Strain: Low Risk  (2/26/2024)    Overall Financial Resource Strain (CARDIA)     Difficulty of Paying Living Expenses: Not very hard   Food Insecurity: No Food Insecurity (2/26/2024)    Hunger Vital Sign     Worried About Running Out of Food in the Last Year: Never true     Ran Out of Food in the Last Year: Never true   Transportation Needs: Unknown (2/26/2024)    PRAPARE - Transportation     Lack of

## 2024-11-18 ENCOUNTER — OFFICE VISIT (OUTPATIENT)
Dept: FAMILY MEDICINE CLINIC | Age: 19
End: 2024-11-18
Payer: COMMERCIAL

## 2024-11-18 VITALS
HEART RATE: 105 BPM | HEIGHT: 68 IN | BODY MASS INDEX: 19.49 KG/M2 | TEMPERATURE: 98.4 F | RESPIRATION RATE: 16 BRPM | DIASTOLIC BLOOD PRESSURE: 72 MMHG | SYSTOLIC BLOOD PRESSURE: 126 MMHG | WEIGHT: 128.6 LBS | OXYGEN SATURATION: 98 %

## 2024-11-18 DIAGNOSIS — F32.5 MAJOR DEPRESSIVE DISORDER WITH SINGLE EPISODE, IN FULL REMISSION (HCC): Primary | ICD-10-CM

## 2024-11-18 DIAGNOSIS — F41.0 PANIC ATTACK: ICD-10-CM

## 2024-11-18 DIAGNOSIS — F41.1 GAD (GENERALIZED ANXIETY DISORDER): ICD-10-CM

## 2024-11-18 PROCEDURE — 99214 OFFICE O/P EST MOD 30 MIN: CPT | Performed by: NURSE PRACTITIONER

## 2024-11-18 RX ORDER — BUSPIRONE HYDROCHLORIDE 7.5 MG/1
7.5 TABLET ORAL 2 TIMES DAILY
Qty: 60 TABLET | Refills: 1 | Status: SHIPPED | OUTPATIENT
Start: 2024-11-18 | End: 2024-12-18

## 2024-11-18 RX ORDER — BUPROPION HYDROCHLORIDE 150 MG/1
150 TABLET ORAL EVERY MORNING
Qty: 30 TABLET | Refills: 3 | Status: SHIPPED | OUTPATIENT
Start: 2024-11-18

## 2024-11-18 ASSESSMENT — ENCOUNTER SYMPTOMS
RESPIRATORY NEGATIVE: 1
GASTROINTESTINAL NEGATIVE: 1
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
## 2025-07-16 ENCOUNTER — OFFICE VISIT (OUTPATIENT)
Dept: FAMILY MEDICINE CLINIC | Age: 20
End: 2025-07-16
Payer: COMMERCIAL

## 2025-07-16 VITALS
OXYGEN SATURATION: 97 % | TEMPERATURE: 98.2 F | HEART RATE: 111 BPM | WEIGHT: 108 LBS | HEIGHT: 68 IN | SYSTOLIC BLOOD PRESSURE: 118 MMHG | RESPIRATION RATE: 16 BRPM | BODY MASS INDEX: 16.37 KG/M2 | DIASTOLIC BLOOD PRESSURE: 68 MMHG

## 2025-07-16 DIAGNOSIS — R63.4 WEIGHT LOSS: ICD-10-CM

## 2025-07-16 DIAGNOSIS — R11.2 NAUSEA AND VOMITING, UNSPECIFIED VOMITING TYPE: Primary | ICD-10-CM

## 2025-07-16 DIAGNOSIS — R10.30 LOWER ABDOMINAL PAIN: ICD-10-CM

## 2025-07-16 DIAGNOSIS — R10.9 GASTRIC PAIN: ICD-10-CM

## 2025-07-16 DIAGNOSIS — F41.1 GAD (GENERALIZED ANXIETY DISORDER): ICD-10-CM

## 2025-07-16 DIAGNOSIS — R00.0 TACHYCARDIA: ICD-10-CM

## 2025-07-16 DIAGNOSIS — F32.5 MAJOR DEPRESSIVE DISORDER WITH SINGLE EPISODE, IN FULL REMISSION: ICD-10-CM

## 2025-07-16 PROCEDURE — 99214 OFFICE O/P EST MOD 30 MIN: CPT | Performed by: NURSE PRACTITIONER

## 2025-07-16 RX ORDER — OMEPRAZOLE 20 MG/1
20 CAPSULE, DELAYED RELEASE ORAL
Qty: 90 CAPSULE | Refills: 1 | Status: SHIPPED | OUTPATIENT
Start: 2025-07-16

## 2025-07-16 RX ORDER — SUCRALFATE 1 G/1
1 TABLET ORAL 4 TIMES DAILY
Qty: 120 TABLET | Refills: 3 | Status: SHIPPED | OUTPATIENT
Start: 2025-07-16

## 2025-07-16 RX ORDER — ONDANSETRON 4 MG/1
4 TABLET, FILM COATED ORAL 3 TIMES DAILY PRN
Qty: 30 TABLET | Refills: 0 | Status: SHIPPED | OUTPATIENT
Start: 2025-07-16

## 2025-07-16 SDOH — ECONOMIC STABILITY: FOOD INSECURITY: WITHIN THE PAST 12 MONTHS, THE FOOD YOU BOUGHT JUST DIDN'T LAST AND YOU DIDN'T HAVE MONEY TO GET MORE.: NEVER TRUE

## 2025-07-16 SDOH — ECONOMIC STABILITY: FOOD INSECURITY: WITHIN THE PAST 12 MONTHS, YOU WORRIED THAT YOUR FOOD WOULD RUN OUT BEFORE YOU GOT MONEY TO BUY MORE.: NEVER TRUE

## 2025-07-16 ASSESSMENT — ENCOUNTER SYMPTOMS
RECTAL PAIN: 0
NAUSEA: 1
ABDOMINAL PAIN: 1
ABDOMINAL DISTENTION: 0
VOMITING: 1
RESPIRATORY NEGATIVE: 1
BLOOD IN STOOL: 1

## 2025-07-16 ASSESSMENT — PATIENT HEALTH QUESTIONNAIRE - PHQ9
SUM OF ALL RESPONSES TO PHQ QUESTIONS 1-9: 13
SUM OF ALL RESPONSES TO PHQ QUESTIONS 1-9: 13
9. THOUGHTS THAT YOU WOULD BE BETTER OFF DEAD, OR OF HURTING YOURSELF: NOT AT ALL
5. POOR APPETITE OR OVEREATING: NEARLY EVERY DAY
SUM OF ALL RESPONSES TO PHQ QUESTIONS 1-9: 13
SUM OF ALL RESPONSES TO PHQ QUESTIONS 1-9: 13
10. IF YOU CHECKED OFF ANY PROBLEMS, HOW DIFFICULT HAVE THESE PROBLEMS MADE IT FOR YOU TO DO YOUR WORK, TAKE CARE OF THINGS AT HOME, OR GET ALONG WITH OTHER PEOPLE: VERY DIFFICULT
4. FEELING TIRED OR HAVING LITTLE ENERGY: NEARLY EVERY DAY
7. TROUBLE CONCENTRATING ON THINGS, SUCH AS READING THE NEWSPAPER OR WATCHING TELEVISION: NOT AT ALL
6. FEELING BAD ABOUT YOURSELF - OR THAT YOU ARE A FAILURE OR HAVE LET YOURSELF OR YOUR FAMILY DOWN: NOT AT ALL
1. LITTLE INTEREST OR PLEASURE IN DOING THINGS: SEVERAL DAYS
3. TROUBLE FALLING OR STAYING ASLEEP: NEARLY EVERY DAY
2. FEELING DOWN, DEPRESSED OR HOPELESS: MORE THAN HALF THE DAYS
8. MOVING OR SPEAKING SO SLOWLY THAT OTHER PEOPLE COULD HAVE NOTICED. OR THE OPPOSITE, BEING SO FIGETY OR RESTLESS THAT YOU HAVE BEEN MOVING AROUND A LOT MORE THAN USUAL: SEVERAL DAYS

## 2025-07-16 NOTE — PROGRESS NOTES
[facility]  [address]  [place] [address]  Dept: [phone]  Dept Fax: [phone]  Loc: [phone]    Mily Suárez is a 20 y.o. femalewho presents today for her medical conditions/complaints as noted below.  Mily Isaac c/o of Follow-up (Lack of appetite, ongoing vomiting/ upset stomach  after eating, accidental weight loss, for about a month )      :     HPI    Pt here to discuss chronic N/V for the last month   Has been to ER once for this   States she has only been able to eat cucumbers and honey ham    All other foods have been causing nausea and vomiting and she Is losing weight   -No fevers, admits to gastric pain with eating.   -vomits only after eating, will have gastri pain no blood in vomit   Nauseous all day long   Just had her period, no on birth  control  Urinating well having formed stool  Did have 1 episode of blood in stool   Denies rectal pain          Anxiety/MDD  -taking wellbutrin 150 mg daily,   Symptoms controlled       Wt Readings from Last 3 Encounters:   07/16/25 49 kg (108 lb)   11/18/24 58.3 kg (128 lb 9.6 oz)   02/26/24 64.6 kg (142 lb 6.4 oz)      Lab Results   Component Value Date    WBC 14.2 (H) 04/05/2022    HGB 13.8 04/05/2022    HCT 43.1 04/05/2022    MCV 86.9 04/05/2022     04/05/2022      Lab Results   Component Value Date/Time     04/05/2022 12:16 AM    K 4.1 04/05/2022 12:16 AM     04/05/2022 12:16 AM    CO2 25 04/05/2022 12:16 AM    BUN 9 04/05/2022 12:16 AM    CREATININE 0.8 04/05/2022 12:16 AM    GLUCOSE 104 04/05/2022 12:16 AM    CALCIUM 9.7 04/05/2022 12:16 AM       Lab Results   Component Value Date    TSH 0.617 04/05/2022            Lab Results   Component Value Date    LIPASE 25.4 04/05/2022      .ltd  Current Outpatient Medications   Medication Sig Dispense Refill    omeprazole (PRILOSEC) 20 MG delayed release capsule Take 1 capsule by mouth 2 times daily (before meals)

## 2025-07-17 ENCOUNTER — TELEPHONE (OUTPATIENT)
Dept: FAMILY MEDICINE CLINIC | Age: 20
End: 2025-07-17

## 2025-07-17 LAB
ANION GAP SERPL CALCULATED.3IONS-SCNC: 7 MMOL/L (ref 4–12)
BASOPHILS ABSOLUTE: 100 /CMM (ref 0–200)
BASOPHILS RELATIVE PERCENT: 0.7 % (ref 0–2)
BUN BLDV-MCNC: 10 MG/DL (ref 7–20)
CALCIUM SERPL-MCNC: 9.6 MG/DL (ref 8.8–10.5)
CHLORIDE BLD-SCNC: 101 MEQ/L (ref 101–111)
CO2: 28 MEQ/L (ref 21–32)
CREAT SERPL-MCNC: 0.75 MG/DL (ref 0.6–1.3)
CREATININE CLEARANCE: >60
EOSINOPHILS ABSOLUTE: 200 /CMM (ref 0–500)
EOSINOPHILS RELATIVE PERCENT: 1.8 % (ref 0–6)
GLUCOSE: 80 MG/DL (ref 70–110)
HCG QUANTITATIVE: 0.3 MIU/ML
HCT VFR BLD CALC: 40.7 % (ref 35–44)
HEMOGLOBIN: 13.3 GM/DL (ref 12–15)
HYPOCHROMIA: ABNORMAL
LYMPHOCYTES ABSOLUTE: 2000 /CMM (ref 1000–4800)
LYMPHOCYTES RELATIVE PERCENT: 22 % (ref 15–45)
MCH RBC QN AUTO: 25.6 PG (ref 27.5–33)
MCHC RBC AUTO-ENTMCNC: 32.7 GM/DL (ref 33–36)
MCV RBC AUTO: 78.4 CU MIC (ref 80–97)
MONOCYTES ABSOLUTE: 800 /CMM (ref 0–800)
MONOCYTES RELATIVE PERCENT: 8.9 % (ref 2–10)
NEUTROPHILS ABSOLUTE: 6100 /CMM (ref 1800–7700)
NEUTROPHILS RELATIVE PERCENT: 66.6 % (ref 40–70)
NUCLEATED RBCS: 0 /100 WBC
PDW BLD-RTO: 17.7 % (ref 12–16)
PLATELET # BLD: 356 TH/CMM (ref 150–400)
POTASSIUM SERPL-SCNC: 4.3 MEQ/L (ref 3.6–5)
RBC # BLD: 5.19 MIL/CMM (ref 4–5.1)
SODIUM BLD-SCNC: 136 MEQ/L (ref 135–145)
TSH REFLEX: 1.12 MCIU/ML (ref 0.49–4.67)
WBC # BLD: 9.1 TH/CMM (ref 4.4–10.5)

## 2025-07-17 NOTE — TELEPHONE ENCOUNTER
Left message on answering machine. Requested pt to call back at [phone], at their earliest convenience.

## 2025-07-17 NOTE — TELEPHONE ENCOUNTER
Azeem Myles, APRN - CNP  [facility] Clinical Staff    Let pt know labs are stable, pregnancy negative for pregnancy . The size of her red blood cells are a little smaller than normal but she does not have anemia but do recommend she start a daily MVI , will await results of stool testing and CT of abdomen and do recommend GI f/u they should be contacting her for an appt.

## 2025-07-18 NOTE — TELEPHONE ENCOUNTER
Left detailed message regarding lab results. Okay per HIPAA. Requested call back at [phone]  if they have any further questions.